# Patient Record
Sex: MALE | Race: BLACK OR AFRICAN AMERICAN | NOT HISPANIC OR LATINO | Employment: UNEMPLOYED | URBAN - METROPOLITAN AREA
[De-identification: names, ages, dates, MRNs, and addresses within clinical notes are randomized per-mention and may not be internally consistent; named-entity substitution may affect disease eponyms.]

---

## 2023-06-15 DIAGNOSIS — M54.50 LUMBAR PAIN: ICD-10-CM

## 2023-06-15 DIAGNOSIS — M54.12 CERVICAL RADICULOPATHY: Primary | ICD-10-CM

## 2023-09-20 ENCOUNTER — CLINICAL SUPPORT (OUTPATIENT)
Dept: REHABILITATION | Facility: HOSPITAL | Age: 63
End: 2023-09-20
Payer: MEDICAID

## 2023-09-20 DIAGNOSIS — M54.50 LUMBAR PAIN: ICD-10-CM

## 2023-09-20 DIAGNOSIS — M25.659 STIFFNESS OF HIP JOINT, UNSPECIFIED LATERALITY: ICD-10-CM

## 2023-09-20 DIAGNOSIS — M54.12 CERVICAL RADICULOPATHY: ICD-10-CM

## 2023-09-20 DIAGNOSIS — M53.86 DECREASED ROM OF LUMBAR SPINE: ICD-10-CM

## 2023-09-20 PROCEDURE — 97161 PT EVAL LOW COMPLEX 20 MIN: CPT

## 2023-09-20 PROCEDURE — 97110 THERAPEUTIC EXERCISES: CPT

## 2023-09-20 NOTE — PLAN OF CARE
[facility]   Physical Therapy Initial Evaluation     Date: 9/20/2023   Name: Pankaj Chiang  Clinic Number: [id number]    Therapy Diagnosis:   Encounter Diagnoses   Name Primary?    Cervical radiculopathy     Lumbar pain     Decreased ROM of lumbar spine     Stiffness of hip joint, unspecified laterality      Physician: Moni Valentine, NP    Physician Orders: Aquatic Therapy   Medical Diagnosis from Referral: Cervical radiculopathy [M54.12], Lumbar pain [M54.50]  Evaluation Date: 9/20/2023  Authorization Period Expiration: 12/29/2023  Plan of Care Expiration: 11/20/2023  Visit # / Visits authorized: 1/ 1     Precautions: Standard and Fall    Time In: 11:10 am  Time Out: 11:55 am  Total Appointment Time (timed & untimed codes): 45 minutes     SUBJECTIVE   Date of onset: chronic    History of current condition - Pankaj reports: he is interested in trying aquatic therapy for his low back and legs. He reports he thought this was for water aerobics class. Mr. Lira reports a history of low back and bilateral lower extremity stiffness. He also reports occasional numbness and tingling along the outside of his legs which can go down to his calves. He reports his s/s increase with prolonged static positions and improve with movement. He had been going to the [facility] pool across the street and walking laps in the pool and doing the cold plunge, but he froze his membership when they started to do facility work. 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 He hopes to be able to learn how to do exercises to help him in order to manage his stiffness/symptoms on his own.    Falls: none    Prior Therapy: recently received OP PT for his left shoulder   Occupation: retired  Prior Level of Function: independent  Current Level of Function: independent    Pain:  Current 0/10, worst 4/10, best 0/10   Location: bilateral back    Description:  Aching, Dull, Tingling, and Shooting  Aggravating Factors: Sitting, at night, sleeping, first thing in the morning  Easing Factors: movement, bath,     Patients goals: improve independence with aquatic HEP; reduce stiffness in the morning      Medical History:   No past medical history on file.    Surgical History:   Pankaj Chiang  has no past surgical history on file.    Medications:   Pankaj has a current medication list which includes the following prescription(s): tamsulosin.    Allergies:   Review of patient's allergies indicates:  No Known Allergies     Pool contraindications, including but not limited to, incontinence, seizures, fever/GI issues were reviewed with the patient. Patient agrees that based on their knowledge and medical history, they are appropriate for Aquatic Therapy.     OBJECTIVE     TU.58sec with no AD     5 Times Sit to Stand: 28.60sec     Gait: reduced bilateral arm swing and thoracic rotation      Lumbar Range of Motion:     Right Pain   Flexion 75% Painful stretch reported in hamstrings   Extension 25%    SBR 25%    SBL 25%      Hip Range of Motion:     Right Left   Flexion 80 deg 75 deg        Lower Extremity Strength  Right LE   Left LE     Knee extension: 4/5 Knee extension: 4/5   Knee flexion: 4/5 Knee flexion: 4/5   Hip flexion: 4/5 Hip flexion: 4/5   Hip abduction: 4-/5 Hip abduction: 4-/5   Ankle dorsiflexion: 4+/5 Ankle dorsiflexion: 4+/5   Ankle plantarflexion: 4/5 Ankle plantarflexion: 4/5       Flexibility:   Hamstring R: mod restriction       L: mod restriction     Piriformis R: mod restriction     L: mod restriction      TREATMENT     Total Treatment time (time-based codes) separate from Evaluation: 15 minutes      Pankaj received the treatments listed below:      THERAPEUTIC EXERCISES to develop strength, ROM, and flexibility for 15 minutes including   LTR  SKTC  Bridge  Active hamstring stretch      PATIENT EDUCATION AND HOME EXERCISES     Education provided:   -  Diagnosis, prognosis, relevant anatomy, role of therapy  - Extensive education provided on aquatic PT vs water aerobics class    Written Home Exercises Provided: Exercises were reviewed and Pankaj was able to demonstrate them prior to the end of the session.  Pankaj demonstrated good  understanding of the education provided. See EMR under Patient Instructions for exercises provided during therapy sessions.    ASSESSMENT     Pankaj is a 62 y.o. male referred to outpatient Physical Therapy with a medical diagnosis of Cervical radiculopathy [M54.12], Lumbar pain [M54.50]. Patient presents with reduced lumbar AROM, reduced hip flexion AROM, decreased tissue extensibility throughout bilateral lower extremity, and decreased strength throughout bilateral lower extremity.  Pt would benefit from skilled aquatic PT to address deficits, reduce pain, and improve mobility in order to improve his ability to perform desired activities. Pt was given printed HEP and printed handout of free, local pools and encouraged to call to discuss water aerobics class hours and open pool hours. Pt reports he would like to attend aquatic PT until he feels ready to perform exercises on his own at a pool.     Patient prognosis is Good.     Patient will benefit from skilled outpatient Physical Therapy to address the deficits stated above and in the chart below, provide patient /family education, and to maximize patientt's level of independence.     Plan of care discussed with patient: Yes    Patient's spiritual, cultural and educational needs considered and patient is agreeable to the plan of care and goals as stated below:     Anticipated Barriers for therapy: None    Medical Necessity is demonstrated by the following  History  Co-morbidities and personal factors that may impact the plan of care Co-morbidities:   None reported by pt      low   Examination  Body Structures and Functions, activity limitations and participation restrictions that may  impact the plan of care Body Regions:   back  lower extremities  trunk    Body Systems:    gross symmetry  ROM  strength  gross coordinated movement  balance  gait    Participation Restrictions:   none    Activity limitations:   Learning and applying knowledge  no deficits    General Tasks and Commands  no deficits    Communication  no deficits    Mobility  no deficits    Self care  no deficits    Domestic Life  no deficits    Interactions/Relationships  no deficits    Life Areas  no deficits    Community and Social Life  no deficits         low   Clinical Presentation stable and uncomplicated low   Decision Making/ Complexity Score: low       Goals:  Short Term Goals: 6 weeks   Patient will be independent in HEP & progressions.  Patient will achieve TUG score of 12 sec to demonstrate improved mobility  The patient will achieve 5 sit to stand score of 24 seconds to demonstrate improved transfers and endurance.   Patent is able to demonstrate improvement in bilateral hip flexion MMT by 1/2 grade without pain reports during testing.    Patent is able to demonstrate improvement in bilateral hip extension MMT by 1/2 grade without pain reports during testing.      Long Term Goals: 12 weeks   The patient will demonstrate independence with extensive HEP.  Patient will achieve 5 sit to stand score of 20 seconds to demonstrate improved transfers & endurance.  Patent is able to demonstrate improvement in bilateral hip flexion MMT by 1 grade without pain reports during testing.    Patent is able to demonstrate improvement in bilateral hip extension MMT by 1 grade without pain reports during testing.      PLAN   Plan of care Certification: 9/20/2023 to 11/20/2023.    Outpatient Physical Therapy 1-2 times weekly for 12 weeks to include the following interventions: manual therapy, aquatic therapy, patient education, therapeutic exercise, therapeutic activities.    Patient may be seen by PTA as part of rehabilitation  team.    Jami Morley, PT      I CERTIFY THE NEED FOR THESE SERVICES FURNISHED UNDER THIS PLAN OF TREATMENT AND WHILE UNDER MY CARE   Physician's comments:     Physician's Signature: ___________________________________________________

## 2023-09-22 NOTE — PROGRESS NOTES
[facility]   Physical Therapy Treatment Note     Name: Pankaj Chiang  Clinic Number: [id number]    Therapy Diagnosis:   Encounter Diagnoses   Name Primary?    Decreased ROM of lumbar spine Yes    Stiffness of hip joint, unspecified laterality      Physician: Moni Valentine NP    Visit Date: 9/25/2023    Physician Orders: Aquatic Therapy   Medical Diagnosis from Referral: Cervical radiculopathy [M54.12], Lumbar pain [M54.50]  Evaluation Date: 9/20/2023  Authorization Period Expiration: 12/29/2023  Plan of Care Expiration: 11/20/2023  Visit # / Visits authorized: 1/ 20 + Eval      Precautions: Standard and Fall    Time In: 10:00 am  Time Out: 10:55 am  Total Billable Time: 55 minutes TE-4    SUBJECTIVE     Pt reports: he was able to do the HEP x1 over the weekend. He reports his back is feeling okay this morning.    He was compliant with home exercise program.    Response to previous treatment: first pool visit  Functional change: initiated aquatic PT    Pain: 0/10  Location: bilateral back      OBJECTIVE     Objective Measures updated at progress report unless specified.     Treatment     Pankaj received aquatic therapeutic exercises to develop strength, endurance, ROM, flexibility, posture, and core stabilization for 55 minutes including:    FUNCTIONAL MOBILITY TRAINING x 2 laps each at beginning and 1 lap each at end of session  Walk forward/backward/lateral    STRETCHES 2 x 30sec  Hamstring stretch at step    LE EX x 20  Squat  Heel raise with gluteal set  Hip abduction/adduction  Hip flex/ext    Sit to stand from pool stool    Walking marches with orange DB x 2 laps    UE EX/CORE  x 20 (away from wall)  Shoulder flex/ext TA activation paddles CLOSED  Shoulder horizontal abd/add TA activation paddles CLOSED  Shoulder abd/add with TA activation and paddles CLOSED  Mini squat with push/pull red kickboard      ENDURANCE  LTR x 2'  Bicycle in // bars x 3'      Patient Education and Home  Exercises     Home Exercises Provided and Patient Education Provided     Education provided:   Role of aquatic therapy  Hydration post therapy    Written Home Exercises Provided: Patient instructed to cont prior HEP. Exercises were reviewed and Pankaj was able to demonstrate them prior to the end of the session.  Pankaj demonstrated good  understanding of the education provided. See EMR under Patient Instructions for exercises provided during therapy sessions    ASSESSMENT     Patient required mod verbal and visual cues for proper technique & core stabilization during incorporation of all new exercises today. Patient requires min VC's to remain in pain free range throughout exercises. Plan to continue to progress per tolerance.    Pankaj Is progressing well towards his goals.   Pt prognosis is Good.     Pt will continue to benefit from skilled outpatient physical therapy to address the deficits listed in the problem list box on initial evaluation, provide pt/family education and to maximize pt's level of independence in the home and community environment.     Pt's spiritual, cultural and educational needs considered and pt agreeable to plan of care and goals.     Anticipated barriers to physical therapy: none    Goals:   Short Term Goals: 6 weeks   Patient will be independent in HEP & progressions.  Patient will achieve TUG score of 12 sec to demonstrate improved mobility  The patient will achieve 5 sit to stand score of 24 seconds to demonstrate improved transfers and endurance.   Patent is able to demonstrate improvement in bilateral hip flexion MMT by 1/2 grade without pain reports during testing.    Patent is able to demonstrate improvement in bilateral hip extension MMT by 1/2 grade without pain reports during testing.       Long Term Goals: 12 weeks   The patient will demonstrate independence with extensive HEP.  Patient will achieve 5 sit to stand score of 20 seconds to demonstrate improved transfers &  endurance.  Patent is able to demonstrate improvement in bilateral hip flexion MMT by 1 grade without pain reports during testing.    Patent is able to demonstrate improvement in bilateral hip extension MMT by 1 grade without pain reports during testing.      PLAN     Plan of care Certification: 9/20/2023 to 11/20/2023.     Outpatient Physical Therapy 1-2 times weekly for 12 weeks to include the following interventions: manual therapy, aquatic therapy, patient education, therapeutic exercise, therapeutic activities.     Patient may be seen by PTA as part of rehabilitation team.    Jami Morley, PT

## 2023-09-25 ENCOUNTER — CLINICAL SUPPORT (OUTPATIENT)
Dept: REHABILITATION | Facility: HOSPITAL | Age: 63
End: 2023-09-25
Payer: MEDICAID

## 2023-09-25 DIAGNOSIS — M25.659 STIFFNESS OF HIP JOINT, UNSPECIFIED LATERALITY: ICD-10-CM

## 2023-09-25 DIAGNOSIS — M53.86 DECREASED ROM OF LUMBAR SPINE: Primary | ICD-10-CM

## 2023-09-25 PROCEDURE — 97110 THERAPEUTIC EXERCISES: CPT

## 2023-10-02 ENCOUNTER — CLINICAL SUPPORT (OUTPATIENT)
Dept: REHABILITATION | Facility: HOSPITAL | Age: 63
End: 2023-10-02
Payer: MEDICAID

## 2023-10-02 DIAGNOSIS — M53.86 DECREASED ROM OF LUMBAR SPINE: Primary | ICD-10-CM

## 2023-10-02 DIAGNOSIS — M25.659 STIFFNESS OF HIP JOINT, UNSPECIFIED LATERALITY: ICD-10-CM

## 2023-10-02 PROCEDURE — 97110 THERAPEUTIC EXERCISES: CPT

## 2023-10-02 NOTE — PROGRESS NOTES
[facility]   Physical Therapy Treatment Note     Name: Pankaj Chiang  Clinic Number: [id number]    Therapy Diagnosis:   Encounter Diagnoses   Name Primary?    Decreased ROM of lumbar spine Yes    Stiffness of hip joint, unspecified laterality        Physician: Moni Valentine NP    Visit Date: 10/2/2023    Physician Orders: Aquatic Therapy   Medical Diagnosis from Referral: Cervical radiculopathy [M54.12], Lumbar pain [M54.50]  Evaluation Date: 9/20/2023  Authorization Period Expiration: 12/29/2023  Plan of Care Expiration: 11/20/2023  Visit # / Visits authorized: 2/ 20 + Eval      Precautions: Standard and Fall    Time In: 10:00 am  Time Out: 11:00 am  Total Billable Time: 60 minutes TE-4    SUBJECTIVE     Pt reports: he felt good after last session. He's been trying to be better about doing his HEP at home.    He was compliant with home exercise program.    Response to previous treatment: reduced low back pain  Functional change: waking up with less stiffness    Pain: 0/10  Location: bilateral back      OBJECTIVE     Objective Measures updated at progress report unless specified.     Treatment     Pankaj received aquatic therapeutic exercises to develop strength, endurance, ROM, flexibility, posture, and core stabilization for 60 minutes including:    FUNCTIONAL MOBILITY TRAINING x 2 laps each at beginning and 1 lap each at end of session  Walk forward/backward/lateral    STRETCHES 2 x 30sec  Hamstring stretch at step  +Gastroc stretch at step     LE EX x 25  Squat  Heel raise with gluteal set  Hip abduction/adduction  Hip flex/ext    Sit to stand from pool stool    Walking marches with orange DB x 2 laps    UE EX/CORE  x 25 (away from wall)  Shoulder flex/ext TA activation paddles CLOSED  Shoulder horizontal abd/add TA activation paddles CLOSED  Shoulder abd/add with TA activation and paddles CLOSED  Mini squat with push/pull red kickboard      ENDURANCE  LTR x 2'  Bicycle in //  bars x 3'      Patient Education and Home Exercises     Home Exercises Provided and Patient Education Provided     Education provided:   Role of aquatic therapy  Hydration post therapy    Written Home Exercises Provided: Patient instructed to cont prior HEP. Exercises were reviewed and Pankaj was able to demonstrate them prior to the end of the session.  Pankaj demonstrated good  understanding of the education provided. See EMR under Patient Instructions for exercises provided during therapy sessions    ASSESSMENT     Patient requires min verbal cuing for set up during exercises. Good tolerance to repetition progression for bilateral LE and upper extremity/core exercises today. Demonstrates decreased tissue extensibility throughout posterior chain lower extremity musculature and was encouraged to complete hamstring and gastroc stretch at home as part of HEP. Plan to continue to progress per tolerance.     Pankaj Is progressing well towards his goals.   Pt prognosis is Good.     Pt will continue to benefit from skilled outpatient physical therapy to address the deficits listed in the problem list box on initial evaluation, provide pt/family education and to maximize pt's level of independence in the home and community environment.     Pt's spiritual, cultural and educational needs considered and pt agreeable to plan of care and goals.     Anticipated barriers to physical therapy: none    Goals:   Short Term Goals: 6 weeks   Patient will be independent in HEP & progressions.  Patient will achieve TUG score of 12 sec to demonstrate improved mobility  The patient will achieve 5 sit to stand score of 24 seconds to demonstrate improved transfers and endurance.   Patent is able to demonstrate improvement in bilateral hip flexion MMT by 1/2 grade without pain reports during testing.    Patent is able to demonstrate improvement in bilateral hip extension MMT by 1/2 grade without pain reports during testing.       Long Term  Goals: 12 weeks   The patient will demonstrate independence with extensive HEP.  Patient will achieve 5 sit to stand score of 20 seconds to demonstrate improved transfers & endurance.  Patent is able to demonstrate improvement in bilateral hip flexion MMT by 1 grade without pain reports during testing.    Patent is able to demonstrate improvement in bilateral hip extension MMT by 1 grade without pain reports during testing.      PLAN     Plan of care Certification: 9/20/2023 to 11/20/2023.     Outpatient Physical Therapy 1-2 times weekly for 12 weeks to include the following interventions: manual therapy, aquatic therapy, patient education, therapeutic exercise, therapeutic activities.     Patient may be seen by PTA as part of rehabilitation team.    Jami Morley, PT

## 2024-06-26 ENCOUNTER — HOSPITAL ENCOUNTER (EMERGENCY)
Facility: HOSPITAL | Age: 64
Discharge: HOME | End: 2024-06-27
Attending: STUDENT IN AN ORGANIZED HEALTH CARE EDUCATION/TRAINING PROGRAM
Payer: MEDICAID

## 2024-06-26 DIAGNOSIS — R07.9 CHEST PAIN: Primary | ICD-10-CM

## 2024-06-26 LAB
ALBUMIN SERPL BCP-MCNC: 3.9 G/DL (ref 3.5–5.2)
ALP SERPL-CCNC: 64 U/L (ref 55–135)
ALT SERPL W/O P-5'-P-CCNC: 36 U/L (ref 10–44)
ANION GAP SERPL CALC-SCNC: 9 MMOL/L (ref 8–16)
AST SERPL-CCNC: 24 U/L (ref 10–40)
BASOPHILS # BLD AUTO: 0.04 K/UL (ref 0–0.2)
BASOPHILS NFR BLD: 0.5 % (ref 0–1.9)
BILIRUB SERPL-MCNC: 0.5 MG/DL (ref 0.1–1)
BNP SERPL-MCNC: <10 PG/ML (ref 0–99)
BUN SERPL-MCNC: 16 MG/DL (ref 8–23)
CALCIUM SERPL-MCNC: 9.7 MG/DL (ref 8.7–10.5)
CHLORIDE SERPL-SCNC: 110 MMOL/L (ref 95–110)
CO2 SERPL-SCNC: 23 MMOL/L (ref 23–29)
CREAT SERPL-MCNC: 1.2 MG/DL (ref 0.5–1.4)
DIFFERENTIAL METHOD BLD: ABNORMAL
EOSINOPHIL # BLD AUTO: 0.2 K/UL (ref 0–0.5)
EOSINOPHIL NFR BLD: 3 % (ref 0–8)
ERYTHROCYTE [DISTWIDTH] IN BLOOD BY AUTOMATED COUNT: 12.2 % (ref 11.5–14.5)
EST. GFR  (NO RACE VARIABLE): >60 ML/MIN/1.73 M^2
GLUCOSE SERPL-MCNC: 128 MG/DL (ref 70–110)
HCT VFR BLD AUTO: 42.2 % (ref 40–54)
HGB BLD-MCNC: 14.2 G/DL (ref 14–18)
IMM GRANULOCYTES # BLD AUTO: 0.03 K/UL (ref 0–0.04)
IMM GRANULOCYTES NFR BLD AUTO: 0.4 % (ref 0–0.5)
LYMPHOCYTES # BLD AUTO: 2.2 K/UL (ref 1–4.8)
LYMPHOCYTES NFR BLD: 29.1 % (ref 18–48)
MCH RBC QN AUTO: 32.9 PG (ref 27–31)
MCHC RBC AUTO-ENTMCNC: 33.6 G/DL (ref 32–36)
MCV RBC AUTO: 98 FL (ref 82–98)
MONOCYTES # BLD AUTO: 0.6 K/UL (ref 0.3–1)
MONOCYTES NFR BLD: 7.5 % (ref 4–15)
NEUTROPHILS # BLD AUTO: 4.5 K/UL (ref 1.8–7.7)
NEUTROPHILS NFR BLD: 59.5 % (ref 38–73)
NRBC BLD-RTO: 0 /100 WBC
PLATELET # BLD AUTO: 162 K/UL (ref 150–450)
PMV BLD AUTO: 11.8 FL (ref 9.2–12.9)
POTASSIUM SERPL-SCNC: 3.8 MMOL/L (ref 3.5–5.1)
PROT SERPL-MCNC: 7.5 G/DL (ref 6–8.4)
RBC # BLD AUTO: 4.31 M/UL (ref 4.6–6.2)
SODIUM SERPL-SCNC: 142 MMOL/L (ref 136–145)
TROPONIN I SERPL DL<=0.01 NG/ML-MCNC: <0.006 NG/ML (ref 0–0.03)
WBC # BLD AUTO: 7.62 K/UL (ref 3.9–12.7)

## 2024-06-26 PROCEDURE — 93010 ELECTROCARDIOGRAM REPORT: CPT | Mod: ,,, | Performed by: INTERNAL MEDICINE

## 2024-06-26 PROCEDURE — 83880 ASSAY OF NATRIURETIC PEPTIDE: CPT | Performed by: PHYSICIAN ASSISTANT

## 2024-06-26 PROCEDURE — 80053 COMPREHEN METABOLIC PANEL: CPT | Performed by: PHYSICIAN ASSISTANT

## 2024-06-26 PROCEDURE — 99285 EMERGENCY DEPT VISIT HI MDM: CPT | Mod: 25

## 2024-06-26 PROCEDURE — 25000003 PHARM REV CODE 250: Performed by: PHYSICIAN ASSISTANT

## 2024-06-26 PROCEDURE — 85025 COMPLETE CBC W/AUTO DIFF WBC: CPT | Performed by: PHYSICIAN ASSISTANT

## 2024-06-26 PROCEDURE — 93005 ELECTROCARDIOGRAM TRACING: CPT

## 2024-06-26 PROCEDURE — 86803 HEPATITIS C AB TEST: CPT | Performed by: PHYSICIAN ASSISTANT

## 2024-06-26 PROCEDURE — 87389 HIV-1 AG W/HIV-1&-2 AB AG IA: CPT | Performed by: PHYSICIAN ASSISTANT

## 2024-06-26 PROCEDURE — 84484 ASSAY OF TROPONIN QUANT: CPT | Performed by: PHYSICIAN ASSISTANT

## 2024-06-26 RX ORDER — ACETAMINOPHEN 325 MG/1
650 TABLET ORAL
Status: COMPLETED | OUTPATIENT
Start: 2024-06-26 | End: 2024-06-26

## 2024-06-26 RX ORDER — ASPIRIN 325 MG
325 TABLET ORAL
Status: COMPLETED | OUTPATIENT
Start: 2024-06-26 | End: 2024-06-26

## 2024-06-26 RX ADMIN — ASPIRIN 325 MG ORAL TABLET 325 MG: 325 PILL ORAL at 11:06

## 2024-06-26 RX ADMIN — ACETAMINOPHEN 650 MG: 325 TABLET ORAL at 11:06

## 2024-06-27 VITALS
HEIGHT: 72 IN | HEART RATE: 81 BPM | WEIGHT: 260 LBS | DIASTOLIC BLOOD PRESSURE: 73 MMHG | BODY MASS INDEX: 35.21 KG/M2 | SYSTOLIC BLOOD PRESSURE: 125 MMHG | OXYGEN SATURATION: 95 % | RESPIRATION RATE: 16 BRPM | TEMPERATURE: 98 F

## 2024-06-27 LAB
HCV AB SERPL QL IA: NORMAL
HIV 1+2 AB+HIV1 P24 AG SERPL QL IA: NORMAL
OHS QRS DURATION: 72 MS
OHS QTC CALCULATION: 422 MS
TROPONIN I SERPL DL<=0.01 NG/ML-MCNC: <0.006 NG/ML (ref 0–0.03)

## 2024-06-27 PROCEDURE — 84484 ASSAY OF TROPONIN QUANT: CPT | Performed by: PHYSICIAN ASSISTANT

## 2024-06-27 NOTE — DISCHARGE INSTRUCTIONS
Follow up with cardiology or return to the emergency department sooner for any new or worsening symptoms.    Return to the ED immediately for any new chest pain, shortness of breath, severe abdominal pain, abdominal pain that is constant, nausea/and vomiting that does not stop on its own, severe headache, new numbness, tingling, or weakness anywhere, fever greater than 101F or any additional concerns.

## 2024-06-27 NOTE — ED PROVIDER NOTES
Encounter Date: 6/26/2024       History     Chief Complaint   Patient presents with    Chest Pain     Pt complaining of chest pain x1 day that has been constant and hurts worse when he moves around, denies SOB, nausea, vomiting     63-year-old male with history of hyperlipidemia and thyroid disease presents to the emergency department with chief complaint of chest pain.  He complains of left-sided chest pain that has been relatively constant since yesterday.  He states that it is occasionally worse when he ambulates, although this is not persistent.  He denies any worsening of his pain from walking from the parking lot to the emergency department today.  He occasionally feels short of breath.  No lightheadedness, diaphoresis, abdominal pain, back pain, nausea, vomiting.  Denies pain of this nature in the past.  Drinks alcohol occasionally.  No cigarette use.  No other drug use.  Reports undergoing stress test multiple years ago without abnormality.  He denies other worsening or alleviating factors.      Review of patient's allergies indicates:  No Known Allergies  History reviewed. No pertinent past medical history.  History reviewed. No pertinent surgical history.  No family history on file.  Social History     Tobacco Use    Smoking status: Some Days   Substance Use Topics    Alcohol use: Yes    Drug use: No     Review of Systems   Cardiovascular:  Positive for chest pain.       Physical Exam     Initial Vitals [06/26/24 2016]   BP Pulse Resp Temp SpO2   130/75 94 18 98.1 °F (36.7 °C) 95 %      MAP       --         Physical Exam    Vitals reviewed.  Constitutional: He appears well-developed and well-nourished. He is not diaphoretic. No distress.   Obese   HENT:   Head: Normocephalic and atraumatic.   Mouth/Throat: Oropharynx is clear and moist.   Eyes: EOM are normal. Pupils are equal, round, and reactive to light.   Neck: Neck supple.   Normal range of motion.  Cardiovascular:  Normal rate, regular rhythm, normal  heart sounds and intact distal pulses.     Exam reveals no gallop and no friction rub.       No murmur heard.  Pulmonary/Chest: Breath sounds normal. He has no wheezes. He has no rhonchi. He has no rales.   Abdominal: Abdomen is soft. Bowel sounds are normal. There is no abdominal tenderness.   Musculoskeletal:         General: Normal range of motion.      Cervical back: Normal range of motion and neck supple.     Neurological: He is alert and oriented to person, place, and time. He has normal strength. GCS score is 15. GCS eye subscore is 4. GCS verbal subscore is 5. GCS motor subscore is 6.   Skin: Skin is warm and dry. Capillary refill takes less than 2 seconds.   Psychiatric: He has a normal mood and affect. His behavior is normal. Judgment and thought content normal.         ED Course   Procedures  Labs Reviewed   CBC W/ AUTO DIFFERENTIAL - Abnormal; Notable for the following components:       Result Value    RBC 4.31 (*)     MCH 32.9 (*)     All other components within normal limits    Narrative:     Release to patient->Immediate   COMPREHENSIVE METABOLIC PANEL - Abnormal; Notable for the following components:    Glucose 128 (*)     All other components within normal limits    Narrative:     Release to patient->Immediate   HIV 1 / 2 ANTIBODY    Narrative:     Release to patient->Immediate   HEPATITIS C ANTIBODY    Narrative:     Release to patient->Immediate   TROPONIN I    Narrative:     Release to patient->Immediate   B-TYPE NATRIURETIC PEPTIDE    Narrative:     Release to patient->Immediate     EKG Readings: (Independently Interpreted)   Initial Reading: No STEMI. Rhythm: Normal Sinus Rhythm. Heart Rate: 82.       Imaging Results              X-Ray Chest PA And Lateral (Final result)  Result time 06/27/24 00:19:33      Final result by Cayetano Parkinson MD (06/27/24 00:19:33)                   Impression:      No acute radiographic abnormality.  See above comments.      Electronically signed by: Cayetano  Darling  Date:    06/27/2024  Time:    00:19               Narrative:    EXAMINATION:  XR CHEST PA AND LATERAL    CLINICAL HISTORY:  Chest Pain;    TECHNIQUE:  PA and lateral views of the chest were performed.    COMPARISON:  None    FINDINGS:  Postop changes of the left neck.    Mild probable atelectasis at the left lung base.    The lungs are otherwise clear, with normal appearance of pulmonary vasculature and no pleural effusion or pneumothorax.    The cardiac silhouette is normal in size. The hilar and mediastinal contours are unremarkable.    Bones are intact.                                    X-Rays:   Independently Interpreted Readings:   Chest X-Ray: Normal heart size.  No infiltrates.  No acute abnormalities.     Medications   aspirin tablet 325 mg (325 mg Oral Given 6/26/24 2303)   acetaminophen tablet 650 mg (650 mg Oral Given 6/26/24 2303)     Medical Decision Making  Emergent evaluation of a 63 y.o. male presenting to the emergency department complaining of chest pain. Patient is afebrile, hemodynamically stable, and non toxic appearing.   Will order labs, imaging, analgesia.    Differential diagnosis includes but isn't limited to ACS, pneumothorax, esophageal rupture, esophageal spasm.    No severe metabolic or hematologic derangements.  Troponin negative.  BNP negative.  Chest x-ray without acute abnormality.  On reassessment, patient is resting comfortably.  He reports that his symptoms have improved with ED management.  Offer him admission for chest pain rule out, however he declines.  He would prefer going home if his repeat troponin is negative.  Recommend outpatient cardiology follow-up.  Repeat troponin is pending.  Due to shift change, will sign out to oncoming ED provider who will continue to monitor until final disposition is reached.  Patient is agreeable with plan.  All questions answered.  The patient's history, physical exam, and plan of care was discussed with and agreed upon with my  supervising physician.                  ED Course as of 06/27/24 0051 Wed Jun 26, 2024   2236 EKG with sinus rhythm, rate 82, no STEMI [NN]      ED Course User Index  [NN] Porsche Wu MD                           Clinical Impression:  Final diagnoses:  [R07.9] Chest pain (Primary)                 Clara Solorio, PA-C  06/27/24 0051

## 2024-06-27 NOTE — ED NOTES
Patient identifiers for Pankaj Chiang 63 y.o. male checked and correct.  Chief Complaint   Patient presents with    Chest Pain     Pt complaining of chest pain x1 day that has been constant and hurts worse when he moves around, denies SOB, nausea, vomiting   + diaphoresis at night x few weeks. Chest pain sharp in nature. Worse when moving around and laying flat. Denies fever, chills. +fatigue  History reviewed. No pertinent past medical history.  Allergies reported: Review of patient's allergies indicates:  No Known Allergies      HEENT: Denies vision changes. Denies ear drainage or hearing loss. No c/o nasal drainage. Denies dysphagia or voice changes.   Appearance: Pt awake, alert & oriented to person, place & time. Pt in no acute distress at present time. Pt is clean and well groomed with clothes appropriately fastened.   Skin: Skin warm, dry & intact. Color consistent with ethnicity. Mucous membranes moist. No breakdown or brusing noted.   Musculoskeletal: Patient moving all extremities well, no obvious swelling or deformities noted.   Respiratory: Respirations spontaneous, even, and non-labored. Visible chest rise noted. Airway is open and patent. No accessory muscle use noted.   Neurologic: Sensation is intact. Speech is clear and appropriate. Eyes open spontaneously, behavior appropriate to situation, follows commands, facial expression symmetrical, bilateral hand grasp equal and even, purposeful motor response noted.  Cardiac: All peripheral pulses present. No Bilateral lower extremity edema. Cap refill is <3 seconds. +chest pain  Abdomen: Abdomen soft, non distended, non tender to palpation.   : Pt voids independently, denies dysuria, hematuria, frequency.